# Patient Record
Sex: MALE | Race: WHITE
[De-identification: names, ages, dates, MRNs, and addresses within clinical notes are randomized per-mention and may not be internally consistent; named-entity substitution may affect disease eponyms.]

---

## 2019-04-17 ENCOUNTER — HOSPITAL ENCOUNTER (EMERGENCY)
Dept: HOSPITAL 74 - FER | Age: 44
Discharge: HOME | End: 2019-04-17
Payer: COMMERCIAL

## 2019-04-17 VITALS — SYSTOLIC BLOOD PRESSURE: 142 MMHG | HEART RATE: 80 BPM | DIASTOLIC BLOOD PRESSURE: 84 MMHG

## 2019-04-17 VITALS — TEMPERATURE: 98.3 F

## 2019-04-17 VITALS — BODY MASS INDEX: 32.1 KG/M2

## 2019-04-17 DIAGNOSIS — F99: ICD-10-CM

## 2019-04-17 DIAGNOSIS — F90.9: ICD-10-CM

## 2019-04-17 DIAGNOSIS — I10: Primary | ICD-10-CM

## 2019-04-17 NOTE — PDOC
History of Present Illness





- General


Chief Complaint: Blood Pressure Problem


Stated Complaint: BP ELEVATED


Time Seen by Provider: 04/17/19 21:11





- History of Present Illness


Initial Comments: 





This 44-year-old man with a history of hypertension/ADHD presents with elevated 

blood pressure readings today.  The patient had been seen in urgent care 

earlier today for symptoms consistent with a sinus infection.  At that time, 

his blood pressure was noted to be elevated (patient believes diastolic was 

greater than 110 mmHg).  He was advised to come to the ER immediately or, 

alternatively to go home and monitor his blood pressure.  Patient chose the 

latter option; over the next few hours, readings on his home blood pressure 

monitor was moderately elevated (180/90 range).  He decided to come to the 

emergency room.  He denies chest pain/palpitations/shortness of breath/vision 

changes.  He states that he takes his blood pressure medication daily in the 

morning as prescribed.  Today, he also took an over-the-counter sinus 

medication containing antihistamine and decongestant.





Medications as noted below








Past History





- Past Medical History


Allergies/Adverse Reactions: 


 Allergies











Allergy/AdvReac Type Severity Reaction Status Date / Time


 


No Known Allergies Allergy   Unverified 04/17/19 21:11











Home Medications: 


Ambulatory Orders





Dextroamphetamine/Amphetamine [Adderall Xr 20 mg Capsule] 20 mg PO DAILY 04/17/ 19 


Losartan 50Mg/Hctz 12.5MG [Hyzaar -] 1 tab PO DAILY 04/17/19 








COPD: No


HTN: Yes


Psychiatric Problems: Yes





- Suicide/Smoking/Psychosocial Hx


Smoking History: Never smoked





**Review of Systems





- Review of Systems


Able to Perform ROS?: Yes


Comments:: 





12 point review of systems is negative except for what is noted in the history 

of present illness








*Physical Exam





- Vital Signs


 Last Vital Signs











Temp Pulse Resp BP Pulse Ox


 


 98.3 F   80   16   142/84   99 


 


 04/17/19 21:15  04/17/19 22:00  04/17/19 21:15  04/17/19 22:00  04/17/19 22:00














- Physical Exam


Comments: 





GENERAL: Adult male, alert and oriented 3, in no acute distress.  Vital signs 

as noted


HEAD: Normal with no signs of trauma.


EYES: PERRLA, EOMI, sclera anicteric, conjunctiva clear.


ENT: Ears normal, nares patent, oropharynx clear without exudates.  Moist 

mucous membranes.


NECK: Normal range of motion, supple without lymphadenopathy, JVD, or masses.


LUNGS: Breath sounds equal, clear to auscultation bilaterally.  No wheezes, and 

no crackles.


HEART:Regular rate and rhythm, normal S1 and S2 without murmur, rub or gallop.


ABDOMEN:.normal bowel sounds  No guarding,tenderness or rebound.No masses No 

distention. 


EXTREMITIES: Normal range of motion, no edema.  No clubbing or cyanosis. No 

erythema, or tenderness.


NEUROLOGICAL: Cranial nerves II through XII grossly intact.  Normal speech.  No 

focal 


neurological deficits. 


MUSCULOSKELETAL:  Back non-tender to palpation, no CVA tenderness


SKIN: Warm, Dry, normal turgor, no rashes or lesions noted.








Medical Decision Making





- Medical Decision Making





As noted above, this patient presented after having elevated blood pressure 

measurements today (both at urgent care where he was seen for acute sinusitis 

and measurements taken with blood pressure monitor at home) see's.  Other than 

his symptoms consistent with sinusitis, the patient has no acute new symptoms.  

He has been taking his antihypertensive medication as prescribed but he also 

apparently had decongestant end sinus medication took earlier today.





On presentation here, his initial blood pressure was elevated at 158/109.  No 

specific treatment was given and blood pressure decreased 45 minutes after 

presentation to 142/84.


Since patient continued to be asymptomatic and physical exam was unremarkable, 

his elevation of blood pressure likely related to the decongestant he took 

earlier in the day.  


The patient was discharged with instructions to continue his blood pressure 

medication as previously prescribed.  He should avoid taking any decongestants 

in the future He should monitor his blood pressure at the same time each day 

and to return to the ER if he had persistent high readings.  Also, he should 

return if he has chest pressure/pain, shortness of breath, headache, 

palpitations.  He should call his doctor's office in the morning to arrange 

follow-up within the next 48 hours.








*DC/Admit/Observation/Transfer


Diagnosis at time of Disposition: 


Hypertension


Qualifiers:


 Hypertension type: essential hypertension Qualified Code(s): I10 - Essential (

primary) hypertension








- Discharge Dispostion


Disposition: HOME


Condition at time of disposition: Stable





- Referrals


Referrals: 


ON STAFF,NOT [Primary Care Provider] - 





- Patient Instructions


Printed Discharge Instructions:  DI for High Blood Pressure


Additional Instructions: 


Continue taking your high blood pressure medication as prescribed


Avoid decongestants (pills or nasal spray)


Return to ER if you have headache/shortness of breath/chest pain or protracted 

high blood pressure measurements


Call your doctor's office tomorrow to arrange follow-up within the next 5-7 days





- Post Discharge Activity

## 2022-08-04 ENCOUNTER — OUTPATIENT (OUTPATIENT)
Dept: OUTPATIENT SERVICES | Facility: HOSPITAL | Age: 47
LOS: 1 days | End: 2022-08-04

## 2022-08-04 ENCOUNTER — APPOINTMENT (OUTPATIENT)
Dept: CT IMAGING | Facility: CLINIC | Age: 47
End: 2022-08-04

## 2022-08-04 PROCEDURE — 75571 CT HRT W/O DYE W/CA TEST: CPT | Mod: 26

## 2022-09-20 ENCOUNTER — TRANSCRIPTION ENCOUNTER (OUTPATIENT)
Age: 47
End: 2022-09-20

## 2025-03-14 ENCOUNTER — NON-APPOINTMENT (OUTPATIENT)
Age: 50
End: 2025-03-14

## 2025-03-14 ENCOUNTER — APPOINTMENT (OUTPATIENT)
Dept: NEPHROLOGY | Facility: CLINIC | Age: 50
End: 2025-03-14
Payer: COMMERCIAL

## 2025-03-14 VITALS — SYSTOLIC BLOOD PRESSURE: 138 MMHG | DIASTOLIC BLOOD PRESSURE: 88 MMHG

## 2025-03-14 PROBLEM — Z00.00 ENCOUNTER FOR PREVENTIVE HEALTH EXAMINATION: Status: ACTIVE | Noted: 2025-03-14

## 2025-03-14 PROCEDURE — 99204 OFFICE O/P NEW MOD 45 MIN: CPT

## 2025-03-15 LAB
CORTISOL: 4.4 UG/DL
TSH SERPL-ACNC: 1.17 UIU/ML

## 2025-03-17 ENCOUNTER — APPOINTMENT (OUTPATIENT)
Dept: NEPHROLOGY | Facility: CLINIC | Age: 50
End: 2025-03-17
Payer: COMMERCIAL

## 2025-03-17 VITALS — SYSTOLIC BLOOD PRESSURE: 140 MMHG | DIASTOLIC BLOOD PRESSURE: 84 MMHG | HEART RATE: 74 BPM

## 2025-03-17 DIAGNOSIS — I10 ESSENTIAL (PRIMARY) HYPERTENSION: ICD-10-CM

## 2025-03-17 LAB — ALDOSTERONE SERUM: 12.4 NG/DL

## 2025-03-17 PROCEDURE — 93784 AMBL BP MNTR W/SOFTWARE: CPT

## 2025-03-21 RX ORDER — VALSARTAN AND HYDROCHLOROTHIAZIDE 320; 25 MG/1; MG/1
320-25 TABLET, FILM COATED ORAL DAILY
Qty: 90 | Refills: 1 | Status: ACTIVE | COMMUNITY
Start: 2025-03-21 | End: 1900-01-01

## 2025-03-22 LAB
METANEPHRINE, PL: <25 PG/ML
NORMETANEPHRINE, PL: 147.4 PG/ML
RENIN ACTIVITY, PLASMA: 1.58 NG/ML/HR

## 2025-04-15 ENCOUNTER — APPOINTMENT (OUTPATIENT)
Dept: NEPHROLOGY | Facility: CLINIC | Age: 50
End: 2025-04-15
Payer: COMMERCIAL

## 2025-04-15 ENCOUNTER — NON-APPOINTMENT (OUTPATIENT)
Age: 50
End: 2025-04-15

## 2025-04-15 VITALS — DIASTOLIC BLOOD PRESSURE: 85 MMHG | SYSTOLIC BLOOD PRESSURE: 132 MMHG

## 2025-04-15 DIAGNOSIS — I10 ESSENTIAL (PRIMARY) HYPERTENSION: ICD-10-CM

## 2025-04-15 PROCEDURE — 99213 OFFICE O/P EST LOW 20 MIN: CPT

## 2025-07-22 ENCOUNTER — APPOINTMENT (OUTPATIENT)
Dept: NEPHROLOGY | Facility: CLINIC | Age: 50
End: 2025-07-22